# Patient Record
Sex: MALE | Race: WHITE
[De-identification: names, ages, dates, MRNs, and addresses within clinical notes are randomized per-mention and may not be internally consistent; named-entity substitution may affect disease eponyms.]

---

## 2021-09-20 ENCOUNTER — HOSPITAL ENCOUNTER (OUTPATIENT)
Dept: HOSPITAL 11 - JP.SDS | Age: 61
Discharge: HOME | End: 2021-09-20
Attending: SURGERY
Payer: MEDICAID

## 2021-09-20 DIAGNOSIS — D12.3: Primary | ICD-10-CM

## 2021-09-20 DIAGNOSIS — K64.8: ICD-10-CM

## 2021-09-20 DIAGNOSIS — K62.89: ICD-10-CM

## 2021-09-20 DIAGNOSIS — F17.200: ICD-10-CM

## 2021-09-20 DIAGNOSIS — I10: ICD-10-CM

## 2021-09-20 PROCEDURE — 45398 COLONOSCOPY W/BAND LIGATION: CPT

## 2021-09-20 PROCEDURE — 88305 TISSUE EXAM BY PATHOLOGIST: CPT

## 2021-09-20 PROCEDURE — 45380 COLONOSCOPY AND BIOPSY: CPT

## 2021-09-20 NOTE — OR
DATE OF PROCEDURE:  09/20/2021

 

SURGEON:  William Muhammad MD

 

PROCEDURE:  Colonoscopy.

 

FINDINGS:

1. Transverse colon polyp, approximately 5 mm, completely removed using cold biopsy

    forceps.

2. Endoscopic band of internal hemorrhoids.

 

PREOPERATIVE DIAGNOSIS:  Positive FIT test.

 

POSTOPERATIVE DIAGNOSIS:  Positive FIT test.

 

COMPLICATIONS:  None.

 

ASSISTANT:  None.

 

ANESTHESIA:  MAC.

 

RISKS:  Risks, benefits, alternatives, and limitations including, but not limited to

infection, bleeding, perforation, false positives, and false negatives were explained to the

patient and he wished to proceed.

 

PROCEDURE IN DETAIL:  The patient was placed in left lateral decubitus position.  Rectal

exam was performed without abnormality.  Scope was introduced and advanced atraumatically to

the ileocecal valve.  A photo was taken of the appendiceal orifice.  Scope was brought back

to the ascending, transverse, descending colon, and retroflexed.

 

In the transverse colon, the aforementioned polyp was identified and completely removed.  No

other abnormalities were noted.  The scope was then brought back up to the cecum.  Again, a

second time, no abnormalities were noted.

 

On retroflexion, the patient had a very prominent internal hemorrhoid, which we did discuss

with the patient preoperatively as a possibility and he has elected for banding, which we

then proceeded to endoscopically band without difficulty.  This was the largest hemorrhoid.

The patient tolerated the procedure well.

 

 

 

 

William Muhammad MD

DD:  09/20/2021 09:50:11

DT:  09/20/2021 10:18:48

Job #:  [id number]